# Patient Record
Sex: FEMALE | Race: WHITE | NOT HISPANIC OR LATINO
[De-identification: names, ages, dates, MRNs, and addresses within clinical notes are randomized per-mention and may not be internally consistent; named-entity substitution may affect disease eponyms.]

---

## 2018-04-18 ENCOUNTER — APPOINTMENT (OUTPATIENT)
Dept: PULMONOLOGY | Facility: HOSPITAL | Age: 53
End: 2018-04-18
Payer: COMMERCIAL

## 2018-05-23 ENCOUNTER — APPOINTMENT (OUTPATIENT)
Dept: CT IMAGING | Facility: HOSPITAL | Age: 53
End: 2018-05-23
Payer: COMMERCIAL

## 2018-05-23 ENCOUNTER — OUTPATIENT (OUTPATIENT)
Dept: OUTPATIENT SERVICES | Facility: HOSPITAL | Age: 53
LOS: 1 days | End: 2018-05-23
Payer: COMMERCIAL

## 2018-05-23 ENCOUNTER — APPOINTMENT (OUTPATIENT)
Dept: PULMONOLOGY | Facility: HOSPITAL | Age: 53
End: 2018-05-23
Payer: COMMERCIAL

## 2018-05-23 DIAGNOSIS — Z87.891 PERSONAL HISTORY OF NICOTINE DEPENDENCE: ICD-10-CM

## 2018-05-23 PROCEDURE — XXXXX: CPT

## 2018-05-23 PROCEDURE — G0297: CPT

## 2018-07-23 ENCOUNTER — APPOINTMENT (OUTPATIENT)
Dept: PULMONOLOGY | Facility: CLINIC | Age: 53
End: 2018-07-23

## 2018-08-02 ENCOUNTER — APPOINTMENT (OUTPATIENT)
Dept: PULMONOLOGY | Facility: CLINIC | Age: 53
End: 2018-08-02
Payer: COMMERCIAL

## 2018-08-02 VITALS
TEMPERATURE: 98 F | HEART RATE: 70 BPM | BODY MASS INDEX: 23.78 KG/M2 | OXYGEN SATURATION: 96 % | DIASTOLIC BLOOD PRESSURE: 80 MMHG | RESPIRATION RATE: 16 BRPM | SYSTOLIC BLOOD PRESSURE: 120 MMHG | WEIGHT: 148 LBS | HEIGHT: 66 IN

## 2018-08-02 DIAGNOSIS — J43.9 EMPHYSEMA, UNSPECIFIED: ICD-10-CM

## 2018-08-02 DIAGNOSIS — R93.8 ABNORMAL FINDINGS ON DIAGNOSTIC IMAGING OF OTHER SPECIFIED BODY STRUCTURES: ICD-10-CM

## 2018-08-02 PROCEDURE — 99203 OFFICE O/P NEW LOW 30 MIN: CPT | Mod: 25

## 2018-08-02 PROCEDURE — ZZZZZ: CPT

## 2018-08-02 PROCEDURE — 94729 DIFFUSING CAPACITY: CPT

## 2018-08-02 PROCEDURE — 94060 EVALUATION OF WHEEZING: CPT

## 2018-08-02 PROCEDURE — 94726 PLETHYSMOGRAPHY LUNG VOLUMES: CPT

## 2019-06-03 ENCOUNTER — APPOINTMENT (OUTPATIENT)
Dept: CT IMAGING | Facility: HOSPITAL | Age: 54
End: 2019-06-03

## 2019-06-03 ENCOUNTER — APPOINTMENT (OUTPATIENT)
Dept: PULMONOLOGY | Facility: HOSPITAL | Age: 54
End: 2019-06-03

## 2019-08-01 ENCOUNTER — APPOINTMENT (OUTPATIENT)
Dept: PULMONOLOGY | Facility: HOSPITAL | Age: 54
End: 2019-08-01
Payer: COMMERCIAL

## 2019-08-01 ENCOUNTER — OUTPATIENT (OUTPATIENT)
Dept: OUTPATIENT SERVICES | Facility: HOSPITAL | Age: 54
LOS: 1 days | End: 2019-08-01
Payer: COMMERCIAL

## 2019-08-01 ENCOUNTER — APPOINTMENT (OUTPATIENT)
Dept: PULMONOLOGY | Facility: HOSPITAL | Age: 54
End: 2019-08-01

## 2019-08-01 ENCOUNTER — APPOINTMENT (OUTPATIENT)
Dept: CT IMAGING | Facility: HOSPITAL | Age: 54
End: 2019-08-01
Payer: COMMERCIAL

## 2019-08-01 VITALS — HEIGHT: 65 IN | BODY MASS INDEX: 25.49 KG/M2 | WEIGHT: 153 LBS

## 2019-08-01 DIAGNOSIS — Z87.891 PERSONAL HISTORY OF NICOTINE DEPENDENCE: ICD-10-CM

## 2019-08-01 DIAGNOSIS — Z12.2 ENCOUNTER FOR SCREENING FOR MALIGNANT NEOPLASM OF RESPIRATORY ORGANS: ICD-10-CM

## 2019-08-01 PROCEDURE — G0296 VISIT TO DETERM LDCT ELIG: CPT

## 2019-08-01 PROCEDURE — G0297: CPT | Mod: 26

## 2019-08-01 PROCEDURE — G0297: CPT

## 2019-08-08 ENCOUNTER — APPOINTMENT (OUTPATIENT)
Dept: CARDIOLOGY | Facility: CLINIC | Age: 54
End: 2019-08-08

## 2020-08-20 ENCOUNTER — OUTPATIENT (OUTPATIENT)
Dept: OUTPATIENT SERVICES | Facility: HOSPITAL | Age: 55
LOS: 1 days | End: 2020-08-20
Payer: COMMERCIAL

## 2020-08-20 ENCOUNTER — RESULT REVIEW (OUTPATIENT)
Age: 55
End: 2020-08-20

## 2020-08-20 ENCOUNTER — APPOINTMENT (OUTPATIENT)
Dept: PULMONOLOGY | Facility: HOSPITAL | Age: 55
End: 2020-08-20
Payer: COMMERCIAL

## 2020-08-20 ENCOUNTER — APPOINTMENT (OUTPATIENT)
Dept: CT IMAGING | Facility: HOSPITAL | Age: 55
End: 2020-08-20
Payer: COMMERCIAL

## 2020-08-20 VITALS — HEIGHT: 65 IN | BODY MASS INDEX: 25.49 KG/M2 | WEIGHT: 153 LBS

## 2020-08-20 DIAGNOSIS — Z12.2 ENCOUNTER FOR SCREENING FOR MALIGNANT NEOPLASM OF RESPIRATORY ORGANS: ICD-10-CM

## 2020-08-20 PROCEDURE — XXXXX: CPT

## 2020-08-20 PROCEDURE — G0296 VISIT TO DETERM LDCT ELIG: CPT

## 2020-08-20 PROCEDURE — G0297: CPT | Mod: 26

## 2020-08-20 PROCEDURE — G0297: CPT

## 2020-08-20 NOTE — PLAN
[Regular Exercise] : regular exercise [Smoking Cessation] : smoking cessation [Age appropriate screenings] : Age appropriate screenings [Regular follow-up with healthcare provider] : regular follow-up with healthcare provider [FreeTextEntry1] : Her LDCT is scheduled for today, 8/20/20 at the [facility] location.  Report reviewed with patient. LungRADS2 with recommendation to continue annual screening with LDCT in 12 months.  She was advised to make a follow up appointment with Dr. Kaye.\par \par \par

## 2020-08-20 NOTE — HISTORY OF PRESENT ILLNESS
[Former] : former smoker [_____ pack-years] : [unfilled] pack-years [TextBox_13] : She denies hemoptysis, denies new cough, denies unexplained weight loss. She is a former smoker with a 34 pack year smoking history (1PPD x 34 years). She quit 7 years ago. \par

## 2020-10-29 ENCOUNTER — APPOINTMENT (OUTPATIENT)
Dept: CARDIOLOGY | Facility: CLINIC | Age: 55
End: 2020-10-29
Payer: COMMERCIAL

## 2020-10-29 VITALS
RESPIRATION RATE: 16 BRPM | BODY MASS INDEX: 25.66 KG/M2 | DIASTOLIC BLOOD PRESSURE: 88 MMHG | WEIGHT: 154 LBS | HEIGHT: 65 IN | HEART RATE: 70 BPM | SYSTOLIC BLOOD PRESSURE: 125 MMHG

## 2020-10-29 DIAGNOSIS — R01.1 CARDIAC MURMUR, UNSPECIFIED: ICD-10-CM

## 2020-10-29 DIAGNOSIS — E78.5 HYPERLIPIDEMIA, UNSPECIFIED: ICD-10-CM

## 2020-10-29 DIAGNOSIS — R06.00 DYSPNEA, UNSPECIFIED: ICD-10-CM

## 2020-10-29 DIAGNOSIS — I25.84 ATHEROSCLEROTIC HEART DISEASE OF NATIVE CORONARY ARTERY W/OUT ANGINA PECTORIS: ICD-10-CM

## 2020-10-29 DIAGNOSIS — I25.10 ATHEROSCLEROTIC HEART DISEASE OF NATIVE CORONARY ARTERY W/OUT ANGINA PECTORIS: ICD-10-CM

## 2020-10-29 PROCEDURE — 93000 ELECTROCARDIOGRAM COMPLETE: CPT

## 2020-10-29 PROCEDURE — 99072 ADDL SUPL MATRL&STAF TM PHE: CPT

## 2020-10-29 PROCEDURE — 99204 OFFICE O/P NEW MOD 45 MIN: CPT

## 2020-10-29 NOTE — PHYSICAL EXAM
[General Appearance - Well Developed] : well developed [Normal Appearance] : normal appearance [General Appearance - Well Nourished] : well nourished [Normal Conjunctiva] : the conjunctiva exhibited no abnormalities [Normal Oropharynx] : normal oropharynx [Normal Jugular Venous V Waves Present] : normal jugular venous V waves present [5th Left ICS - MCL] : palpated at the 5th LICS in the midclavicular line [Normal] : normal [No Precordial Heave] : no precordial heave was noted [Normal Rate] : normal [Rhythm Regular] : regular [Normal S1] : normal S1 [Normal S2] : normal S2 [No Gallop] : no gallop heard [I] : a grade 1 [2+] : left 2+ [No Pitting Edema] : no pitting edema present [Respiration, Rhythm And Depth] : normal respiratory rhythm and effort [Exaggerated Use Of Accessory Muscles For Inspiration] : no accessory muscle use [Bowel Sounds] : normal bowel sounds [Abdomen Soft] : soft [Abdomen Tenderness] : non-tender [Abnormal Walk] : normal gait [Nail Clubbing] : no clubbing of the fingernails [Cyanosis, Localized] : no localized cyanosis [Petechial Hemorrhages (___cm)] : no petechial hemorrhages [Skin Color & Pigmentation] : normal skin color and pigmentation [Skin Turgor] : normal skin turgor [] : no rash [No Venous Stasis] : no venous stasis [Oriented To Time, Place, And Person] : oriented to person, place, and time [Impaired Insight] : insight and judgment were intact [Affect] : the affect was normal [No Anxiety] : not feeling anxious [S3] : no S3 [S4] : no S4

## 2020-10-29 NOTE — DISCUSSION/SUMMARY
[FreeTextEntry1] : This is a 55-year-old female with past medical history significant for airway disease, pulmonary nodules, coronary artery calcification, atherosclerosis of the aorta, who comes in for cardiac consultation.\par She denies chest pain, shortness of breath, dizziness and syncope.\par She has no history of rheumatic fever.  She does not drink excessive caffeine or alcohol.  Her cardiac risk factors include history of smoking, Positive family history of heart disease (her sister  from myocardial infarction age 47 and was an insulin-dependent diabetes since childhood, and her father had a stroke at age 60) and hyperlipidemia.\par The patient was found to have a coronary artery calcification score 47 units 2019.  This was described as the majority of the calcification was noted to be in the left anterior descending artery without specific calcification a knowledge of months and the remainder of the coronary artery tree.\par He was placed on Crestor 10 mg per day.\par Blood work done 2020 temperature close to 175, HDL 51, triglycerides 150, calculated LDL of 99 mg/dL, and non-HDL cholesterol 124 mg/dL.\par The presence of coronary artery disease, this patient LDL target should be less than 70 mg/dL.\par She will go for new blood work in the next month.  She will likely require a higher dose of Crestor therapy.\par She will schedule exercise stress test to rule out significant coronary artery disease.  She will also schedule echo Doppler examination to evaluate her murmur, left ventricular function, chamber size, and rule out hypertrophy.\par Electrocardiogram done 2020 demonstrated normal sinus rhythm rate of 84 beats per minute and is otherwise unremarkable.\par She will followup with me at the above noted diagnostic tests are completed.\par

## 2021-08-20 ENCOUNTER — TRANSCRIPTION ENCOUNTER (OUTPATIENT)
Age: 56
End: 2021-08-20

## 2021-12-22 ENCOUNTER — NON-APPOINTMENT (OUTPATIENT)
Age: 56
End: 2021-12-22

## 2021-12-22 VITALS — BODY MASS INDEX: 25.66 KG/M2 | HEIGHT: 65 IN | WEIGHT: 154 LBS

## 2021-12-22 NOTE — PLAN
[Smoking Cessation] : smoking cessation [FreeTextEntry1] : LDCT 12/30/21.  Follow up with Dr. Kaye for the results.

## 2021-12-22 NOTE — HISTORY OF PRESENT ILLNESS
[Former] : former smoker [_____ pack-years] : [unfilled] pack-years [TextBox_13] : Eligibility confirmed.\par Annual Screen\par Former smoker\par 34 pack years (1PPD x 34 years)\par Quit 8 years ago\par Referred by Dr. Panchito Kaye

## 2021-12-30 ENCOUNTER — APPOINTMENT (OUTPATIENT)
Dept: CT IMAGING | Facility: HOSPITAL | Age: 56
End: 2021-12-30

## 2021-12-30 ENCOUNTER — APPOINTMENT (OUTPATIENT)
Dept: PULMONOLOGY | Facility: HOSPITAL | Age: 56
End: 2021-12-30

## 2022-03-03 ENCOUNTER — EMERGENCY (EMERGENCY)
Facility: HOSPITAL | Age: 57
LOS: 1 days | Discharge: HOME | End: 2022-03-03
Attending: EMERGENCY MEDICINE | Admitting: EMERGENCY MEDICINE
Payer: COMMERCIAL

## 2022-03-03 VITALS
DIASTOLIC BLOOD PRESSURE: 90 MMHG | HEART RATE: 87 BPM | TEMPERATURE: 97 F | RESPIRATION RATE: 15 BRPM | OXYGEN SATURATION: 100 % | SYSTOLIC BLOOD PRESSURE: 142 MMHG | HEIGHT: 66 IN

## 2022-03-03 VITALS
TEMPERATURE: 98 F | DIASTOLIC BLOOD PRESSURE: 78 MMHG | SYSTOLIC BLOOD PRESSURE: 123 MMHG | HEART RATE: 84 BPM | RESPIRATION RATE: 18 BRPM | OXYGEN SATURATION: 98 %

## 2022-03-03 LAB
ALBUMIN SERPL ELPH-MCNC: 4.5 G/DL — SIGNIFICANT CHANGE UP (ref 3.3–5)
ALP SERPL-CCNC: 70 U/L — SIGNIFICANT CHANGE UP (ref 40–120)
ALT FLD-CCNC: 23 U/L — SIGNIFICANT CHANGE UP (ref 4–33)
ANION GAP SERPL CALC-SCNC: 12 MMOL/L — SIGNIFICANT CHANGE UP (ref 7–14)
AST SERPL-CCNC: 25 U/L — SIGNIFICANT CHANGE UP (ref 4–32)
BASOPHILS # BLD AUTO: 0.02 K/UL — SIGNIFICANT CHANGE UP (ref 0–0.2)
BASOPHILS NFR BLD AUTO: 0.2 % — SIGNIFICANT CHANGE UP (ref 0–2)
BILIRUB SERPL-MCNC: 0.6 MG/DL — SIGNIFICANT CHANGE UP (ref 0.2–1.2)
BUN SERPL-MCNC: 7 MG/DL — SIGNIFICANT CHANGE UP (ref 7–23)
CALCIUM SERPL-MCNC: 9.1 MG/DL — SIGNIFICANT CHANGE UP (ref 8.4–10.5)
CHLORIDE SERPL-SCNC: 103 MMOL/L — SIGNIFICANT CHANGE UP (ref 98–107)
CO2 SERPL-SCNC: 23 MMOL/L — SIGNIFICANT CHANGE UP (ref 22–31)
CREAT SERPL-MCNC: 0.61 MG/DL — SIGNIFICANT CHANGE UP (ref 0.5–1.3)
D DIMER BLD IA.RAPID-MCNC: <150 NG/ML DDU — SIGNIFICANT CHANGE UP
EGFR: 104 ML/MIN/1.73M2 — SIGNIFICANT CHANGE UP
EOSINOPHIL # BLD AUTO: 0.03 K/UL — SIGNIFICANT CHANGE UP (ref 0–0.5)
EOSINOPHIL NFR BLD AUTO: 0.4 % — SIGNIFICANT CHANGE UP (ref 0–6)
GLUCOSE SERPL-MCNC: 133 MG/DL — HIGH (ref 70–99)
HCT VFR BLD CALC: 42.2 % — SIGNIFICANT CHANGE UP (ref 34.5–45)
HGB BLD-MCNC: 14.5 G/DL — SIGNIFICANT CHANGE UP (ref 11.5–15.5)
IANC: 6.33 K/UL — SIGNIFICANT CHANGE UP (ref 1.5–8.5)
IMM GRANULOCYTES NFR BLD AUTO: 0.4 % — SIGNIFICANT CHANGE UP (ref 0–1.5)
LYMPHOCYTES # BLD AUTO: 1.56 K/UL — SIGNIFICANT CHANGE UP (ref 1–3.3)
LYMPHOCYTES # BLD AUTO: 18.5 % — SIGNIFICANT CHANGE UP (ref 13–44)
MCHC RBC-ENTMCNC: 31 PG — SIGNIFICANT CHANGE UP (ref 27–34)
MCHC RBC-ENTMCNC: 34.4 GM/DL — SIGNIFICANT CHANGE UP (ref 32–36)
MCV RBC AUTO: 90.2 FL — SIGNIFICANT CHANGE UP (ref 80–100)
MONOCYTES # BLD AUTO: 0.45 K/UL — SIGNIFICANT CHANGE UP (ref 0–0.9)
MONOCYTES NFR BLD AUTO: 5.3 % — SIGNIFICANT CHANGE UP (ref 2–14)
NEUTROPHILS # BLD AUTO: 6.33 K/UL — SIGNIFICANT CHANGE UP (ref 1.8–7.4)
NEUTROPHILS NFR BLD AUTO: 75.2 % — SIGNIFICANT CHANGE UP (ref 43–77)
NRBC # BLD: 0 /100 WBCS — SIGNIFICANT CHANGE UP
NRBC # FLD: 0 K/UL — SIGNIFICANT CHANGE UP
PLATELET # BLD AUTO: 219 K/UL — SIGNIFICANT CHANGE UP (ref 150–400)
POTASSIUM SERPL-MCNC: 3.9 MMOL/L — SIGNIFICANT CHANGE UP (ref 3.5–5.3)
POTASSIUM SERPL-SCNC: 3.9 MMOL/L — SIGNIFICANT CHANGE UP (ref 3.5–5.3)
PROT SERPL-MCNC: 7 G/DL — SIGNIFICANT CHANGE UP (ref 6–8.3)
RBC # BLD: 4.68 M/UL — SIGNIFICANT CHANGE UP (ref 3.8–5.2)
RBC # FLD: 13.2 % — SIGNIFICANT CHANGE UP (ref 10.3–14.5)
SODIUM SERPL-SCNC: 138 MMOL/L — SIGNIFICANT CHANGE UP (ref 135–145)
T4 FREE+ TSH PNL SERPL: 0.8 UIU/ML — SIGNIFICANT CHANGE UP (ref 0.27–4.2)
TROPONIN T, HIGH SENSITIVITY RESULT: <6 NG/L — SIGNIFICANT CHANGE UP
WBC # BLD: 8.42 K/UL — SIGNIFICANT CHANGE UP (ref 3.8–10.5)
WBC # FLD AUTO: 8.42 K/UL — SIGNIFICANT CHANGE UP (ref 3.8–10.5)

## 2022-03-03 PROCEDURE — 99285 EMERGENCY DEPT VISIT HI MDM: CPT | Mod: 25

## 2022-03-03 PROCEDURE — 93010 ELECTROCARDIOGRAM REPORT: CPT

## 2022-03-03 RX ADMIN — Medication 0.5 MILLIGRAM(S): at 09:40

## 2022-03-03 NOTE — ED PROVIDER NOTE - NSFOLLOWUPCLINICS_GEN_ALL_ED_FT
[facility]  Psychiatry  [address]  [place] [address]  Phone: [phone]  Fax:     [facility]  Psychiatry  [address] [place] [address]  Phone: [phone]  Fax:

## 2022-03-03 NOTE — ED ADULT TRIAGE NOTE - CHIEF COMPLAINT QUOTE
alert no distress c/o anxiety  has tinnitus insomnia  since december   c/o chest tightness and left arm tingling   states she has had severe anxiety since she had covid in december no  PMHX

## 2022-03-03 NOTE — ED PROVIDER NOTE - INTERNATIONAL TRAVEL
Notes  Assuming care of this 34-year-old female with past medical history of depression and diabetes who presented with SI.  Patient has been medically cleared and is currently awaiting psychiatric placement at [facility].    5:30 PM  Patient still awaiting psychiatric placement.  Will transition care to oncoming physician, Dr. Vaz, for ongoing follow-up.      Past Medical History:   Diagnosis Date   • Depression      No past surgical history on file.    Labs Reviewed   BASIC METABOLIC PANEL - Abnormal; Notable for the following components:       Result Value    Glucose 274 (*)     All other components within normal limits   CBC WITH AUTOMATED DIFFERENTIAL (PERFORMABLE ONLY) - Abnormal; Notable for the following components:    RDW-SD 38.7 (*)     All other components within normal limits    Narrative:     This is an appended report.  These results have been appended to a previously verified report.   URINALYSIS & REFLEX MICROSCOPY WITH CULTURE IF INDICATED - Abnormal; Notable for the following components:    GLUCOSE, URINALYSIS >1000 (*)     OCCULT BLOOD, URINALYSIS Large (*)     BACTERIA, URINALYSIS Moderate (*)     All other components within normal limits   GLUCOSE, BEDSIDE - POINT OF CARE - Abnormal; Notable for the following components:    GLUCOSE, BEDSIDE - POINT OF CARE 183 (*)     All other components within normal limits   GLUCOSE, BEDSIDE - POINT OF CARE - Abnormal; Notable for the following components:    GLUCOSE, BEDSIDE - POINT OF CARE 171 (*)     All other components within normal limits   GLUCOSE, BEDSIDE - POINT OF CARE - Abnormal; Notable for the following components:    GLUCOSE, BEDSIDE - POINT OF CARE 172 (*)     All other components within normal limits   GLUCOSE, BEDSIDE - POINT OF CARE - Abnormal; Notable for the following components:    GLUCOSE, BEDSIDE - POINT OF CARE 171 (*)     All other components within normal limits   GLUCOSE, BEDSIDE - POINT OF CARE - Abnormal; Notable for the following  components:    GLUCOSE, BEDSIDE - POINT OF CARE 188 (*)     All other components within normal limits   GLUCOSE, BEDSIDE - POINT OF CARE - Abnormal; Notable for the following components:    GLUCOSE, BEDSIDE - POINT OF CARE 250 (*)     All other components within normal limits   DRUG SCREEN, URINE - Normal   POCT URINE PREGNANCY - Normal   CBC WITH DIFFERENTIAL    Narrative:     The following orders were created for panel order CBC with Automated Differential.  Procedure                               Abnormality         Status                     ---------                               -----------         ------                     CBC with Automated Dif...[[id number]]  Abnormal            Final result                 Please view results for these tests on the individual orders.   ALCOHOL   LIGHT GREEN TOP   LAVENDER TOP   LIGHT BLUE TOP   LIGHT GREEN TOP   LAVENDER TOP   GOLD TOP   PINK TOP TUBE   RAINBOW DRAW    Narrative:     The following orders were created for panel order [place] Draw Light Blue Top Collected? 1 Label; Red Top Collected? No Labels; Light Green Top Collected? 1 Label; Lavender Top Collected? 1 Label; Gold Top Collected? 1 Label; Pink Top Collected? 1 Label; Grey Top Collected? No Labels.  Procedure                               Abnormality         Status                     ---------                               -----------         ------                     Light Blue Top[[id number]]                                                            Light Green Top[[id number]]                                Final result               Lavender Top[[id number]]                                   Final result               Gold Top[[id number]]                                                                  Pink Top Tube[[id number]]                                                               Please view results for these tests on the individual orders.   LIGHT BLUE TOP   GOLD TOP   PINK TOP TUBE    RAINBOW DRAW    Narrative:     The following orders were created for panel order [place] Draw Light Blue Top Collected? 1 Label; Red Top Collected? 1 Label; Light Green Top Collected? 1 Label; Lavender Top Collected? 1 Label; Gold Top Collected? 1 Label; Pink Top Collected? 1 Label; Grey Top Collected? No Labels.  Procedure                               Abnormality         Status                     ---------                               -----------         ------                     Light Blue Top[[id number]]                                 Final result               Red Top[[id number]]                                                                   Light Green Top[[id number]]                                Final result               Lavender Top[[id number]]                                   Final result               Gold Top[[id number]]                                       Final result               Pink Top Tube[[id number]]                                  Final result                 Please view results for these tests on the individual orders.   RED TOP   RAPID SARS-COV-2 BY PCR       No orders to display               ED Diagnosis   1. Depression, unspecified depression type     2. Self-destructive behavior     3. Suicidal ideation         Disposition        Transfer to Another Facility 4/6/2021  7:48 PM  Sandi Urbina should be transferred out to closed psychiatric facility     Arabella Segovia MD  04/07/21 1816     No

## 2022-03-03 NOTE — ED PROVIDER NOTE - PATIENT PORTAL LINK FT
You can access the FollowMyHealth Patient Portal offered by [facility] by registering at the following website: [email or web address]. By joining [facility]’s FollowMyHealth portal, you will also be able to view your health information using other applications (apps) compatible with our system.

## 2022-03-03 NOTE — ED ADULT NURSE NOTE - CHIEF COMPLAINT
Left message on patients wife's cell phone number as requested. Asked to call back to discuss symptoms.    The patient is a 56y Female complaining of

## 2022-03-03 NOTE — ED ADULT NURSE NOTE - OBJECTIVE STATEMENT
Pt received to room 22. A&Ox4. Ambulatory at baseline. Pmh of tinnitus, anxiety. Pt c/o tinnitus and anxiety s/p positive for COVID in Dec 2021. Pt states after having COVID tinnitus began and was prescribed medications to help sleep at night with no relief. Pt states the stress r/t the tinnitus has caused her chest discomfort and anxiety beginning as of the morning unsure of how long. Pt endorses since Dec 2021 r/t pt's tinnitus, drinking 3-4 glasses of wine per night. Pt states she had stopped approximately 2 weeks ago once prescribed medications to help with anxiety and insomnia. Pt resp even unlabored, breath sounds clear anterior and posterior, abd soft nontender, bowel sounds heard in all four quadrants, pedal pulses 2+ bilaterally. Pt denies SOB, nausea, vomiting, diarrhea, headaches, dizziness, numbness/tingling to hands/feet, feeling of palpitations. Pt vitally stable, NSR on the monitor. Awaiting further orders at this time.

## 2022-03-03 NOTE — ED PROVIDER NOTE - ATTENDING CONTRIBUTION TO CARE
Dr. Bacon: 57 yo female with PMH Covid 3 months ago, with tinnitus since having Covid, also anxiety and insomnia, in ED with insomnia and palpations.  She notes chest tightness sometimes, but no pain or tightness at the time of my eval.  Pt has seen ENT, was told no acute intervention at the time, placed on lipo-flavinoid vitamin, then saw PMD who started her on alprazolam, then transitioned to zolpidem, and she started paroxetine last night.  Pt states that she has been having insomnia--she gets into bed and starts to feel anxious and anticipates that she will not be able to sleep, and so she does not sleep.  Last night she was awake all night despite zolpidem, and so she came to the ED. Dr. Bacon: 55 yo female with PMH Covid 3 months ago, with tinnitus since having Covid, also anxiety and insomnia, in ED with insomnia and palpations.  She notes chest tightness sometimes, but no pain or tightness at the time of my eval.  Pt has seen ENT, was told no acute intervention at the time, placed on lipo-flavinoid vitamin, then saw PMD who started her on alprazolam, then transitioned to zolpidem, and she started paroxetine last night.  Pt states that she has been having insomnia--she gets into bed and starts to feel anxious and anticipates that she will not be able to sleep, and so she does not sleep.  Last night she was awake all night despite zolpidem, and so she came to the ED.  No CP/SOB, N/V/D or abdominal pain.  On exam pt overall well appearing, appears somewhat anxious (pt admits she feels anxious) and in mild distress, heart RRR, lungs CTAB, abd NTND, extremities without swelling, strength 5/5 in all extremities and skin without rash.  Gait normal.  Pt states that if discharged she can follow up with her PMD tomorrow.

## 2022-03-03 NOTE — ED PROVIDER NOTE - CARE PLAN
1 Principal Discharge DX:	Anxiety  Secondary Diagnosis:	Insomnia   Principal Discharge DX:	Insomnia  Secondary Diagnosis:	Anxiety  Secondary Diagnosis:	Insomnia

## 2022-03-03 NOTE — ED PROVIDER NOTE - NS ED ROS FT
GEN: No fever, chills, night sweats, weight loss  EYES: No vision changes, irritation, itchiness  ENT: No ear pain, congestion, sore throat  RESP: No cough or trouble breathing  CARDIOVASCULAR: No chest pain or palpitations  GI: No nausea/vomiting, diarrhea, constipation  :  No change in urine output; no dysuria, hematuria, or discharge  MSK: No joint or muscle pain  SKIN: No rashes  NEURO: No headache; no abnormal movements; no numbness or tingling  Remainder negative, except as per the HPI

## 2022-03-03 NOTE — ED PROVIDER NOTE - OBJECTIVE STATEMENT
Ms. Stephenson is a 56F with h/o COVID-19 (12/2021), anxiety who p/w persistent tinnitus, worsening anxiety and insomnia (as a complication of the tinnitus) that started since she had COVID. She has been seeing her PCP who started with Xanax and then transitioned to Ambien.    ENT: Dr. Valencia

## 2022-03-03 NOTE — ED POST DISCHARGE NOTE - RESULT SUMMARY
Dr. Bacon: pt called after discharge, states that she did not want to be discharged, wanted to be admitted to hospital due to insomnia; discussed with pt that most appropriate management would be outpatient follow up at this time, which pt understands; I also provided pt with [facility] phone number and address if she would like to follow up there

## 2022-03-03 NOTE — ED PROVIDER NOTE - PHYSICAL EXAMINATION
GEN: Awake, AOx3, NAD.  HEENT: NCAT  ---EYES: no scleral icterus, EOMI, PERRLA  CARDIO: RRR. Normal S1/S2, no m/r/g. No JVD.  RESP: CTAB  ABD: Soft, NTND. BS+. No masses, no hepatosplenomegaly.  : No CVAT.   MSK: No obvious deformity or ROM deficit. 2+ pulses x4. No edema.  SKIN: Warm, dry. No rashes. Nail beds without cyanosis or clubbing.  NEURO: Moves all four extremities spontaneously  PSYCH: Appropriate mood & affect. GEN: Awake, AOx3, NAD.  HEENT: NCAT  ---EYES: no scleral icterus  CARDIO: RRR. Normal S1/S2, no m/r/g. No JVD.  RESP: CTAB, no w/r/r  ABD: Soft, NTND.   MSK: No obvious deformity or ROM deficit.   SKIN: Warm, dry.   NEURO: Moves all four extremities spontaneously  PSYCH: Appropriate mood & affect. No SI/HI; speech normal ricky & pattern, not pressured;

## 2022-03-03 NOTE — ED PROVIDER NOTE - CLINICAL SUMMARY MEDICAL DECISION MAKING FREE TEXT BOX
Ms. Stephenson is a 56F with h/o COVID-19 in 12/2021 who presents with persistent tinnitus c/b insomnia c/b anxiety/mood disturbance w/o acute psychiatric complication (incl. no SI/HI, no psychosis). She reports concurrent palpitations with intermittent pleuritic CP and given covid-19 hx and otherwise low risk (no leg swelling or other symptoms), would r/o PE  - CBC, CMP, Trop, D-Dimer  - ECG  - TSH with F54

## 2022-03-03 NOTE — ED PROVIDER NOTE - NSFOLLOWUPINSTRUCTIONS_ED_ALL_ED_FT
You were seen in the Emergency Department for severe tinnitus complicated by insomnia and anxiety. Fortunately, we did not identify an emergent cause of your tinnitus and without other symptoms, we do not believe you are experiencing a life-threatening medical or behavioral emergency at this time. You should continue to follow-up with your ENT for this problem.    You should return to the Emergency Department if you develop:  - Any thoughts of self-harm or severe depression  - Any auditory or visual hallucinations  - Any worsening of your respirations, chest pain, or other symptoms that you feel should be evaluated    Ask your doctor about the following medications for sleep and mood:  - Trazodone: this is a medication sometimes used to help with insomnia, typical doses start at 50mg but can be lower in some patients.  - Other Non-Paclitaxel SSRI's e.g. Duloxetine (these medications can be helpful for mood but do take > 2 months to work)  - Seroquel: This is an anti-psychotic, but it has sedative properties which can be helpful in refractory insomnia.

## 2022-03-03 NOTE — ED PROVIDER NOTE - PROGRESS NOTE DETAILS
Wil Monterroso MD PGY-1  Pt emphatic about anxiety impacting current status. Will give low dose Lorazepam x1, reassess. Would not d/c with new rx. Wil Monterroso MD PGY-1  Labs, including Trop, TSH, D-Dimer all WNL. Low likelihood for emergent process. Will recommend lipo-flavinoid vitamin for tinnitus, provide psych numbers, including [email or web address] Wil Monterroso MD PGY-1  Pt found resting but not asleep, describes liliana-sleep myoclonic jerks that wake her and then prevent her from getting back to sleep. Pt doesn't know of Lorazepam really helped.

## 2022-03-30 ENCOUNTER — APPOINTMENT (OUTPATIENT)
Dept: PULMONOLOGY | Facility: CLINIC | Age: 57
End: 2022-03-30

## 2022-04-20 ENCOUNTER — APPOINTMENT (OUTPATIENT)
Dept: PULMONOLOGY | Facility: CLINIC | Age: 57
End: 2022-04-20

## 2022-05-25 ENCOUNTER — APPOINTMENT (OUTPATIENT)
Dept: PULMONOLOGY | Facility: CLINIC | Age: 57
End: 2022-05-25

## 2022-06-09 ENCOUNTER — APPOINTMENT (OUTPATIENT)
Dept: PULMONOLOGY | Facility: CLINIC | Age: 57
End: 2022-06-09

## 2023-07-13 ENCOUNTER — APPOINTMENT (OUTPATIENT)
Dept: OBGYN | Facility: CLINIC | Age: 58
End: 2023-07-13

## 2023-07-24 ENCOUNTER — ASOB RESULT (OUTPATIENT)
Age: 58
End: 2023-07-24

## 2023-07-24 ENCOUNTER — APPOINTMENT (OUTPATIENT)
Dept: OBGYN | Facility: CLINIC | Age: 58
End: 2023-07-24
Payer: COMMERCIAL

## 2023-07-24 PROCEDURE — 58340 CATHETER FOR HYSTEROGRAPHY: CPT

## 2023-07-24 PROCEDURE — 76831 ECHO EXAM UTERUS: CPT

## 2024-06-06 ENCOUNTER — APPOINTMENT (OUTPATIENT)
Dept: OTOLARYNGOLOGY | Facility: CLINIC | Age: 59
End: 2024-06-06

## 2024-06-11 ENCOUNTER — APPOINTMENT (OUTPATIENT)
Dept: OBGYN | Facility: CLINIC | Age: 59
End: 2024-06-11
Payer: COMMERCIAL

## 2024-06-11 ENCOUNTER — ASOB RESULT (OUTPATIENT)
Age: 59
End: 2024-06-11

## 2024-06-11 PROCEDURE — 58340 CATHETER FOR HYSTEROGRAPHY: CPT

## 2024-06-11 PROCEDURE — 76831 ECHO EXAM UTERUS: CPT

## 2024-08-20 ENCOUNTER — APPOINTMENT (OUTPATIENT)
Dept: OTOLARYNGOLOGY | Facility: CLINIC | Age: 59
End: 2024-08-20
Payer: COMMERCIAL

## 2024-08-20 VITALS
HEIGHT: 66 IN | WEIGHT: 154 LBS | SYSTOLIC BLOOD PRESSURE: 154 MMHG | HEART RATE: 75 BPM | BODY MASS INDEX: 24.75 KG/M2 | DIASTOLIC BLOOD PRESSURE: 94 MMHG

## 2024-08-20 DIAGNOSIS — M26.623 ARTHRALGIA OF BILATERAL TEMPOROMANDIBULAR JOINT: ICD-10-CM

## 2024-08-20 DIAGNOSIS — H93.13 TINNITUS, BILATERAL: ICD-10-CM

## 2024-08-20 DIAGNOSIS — M54.2 CERVICALGIA: ICD-10-CM

## 2024-08-20 PROCEDURE — 92557 COMPREHENSIVE HEARING TEST: CPT

## 2024-08-20 PROCEDURE — 92625 TINNITUS ASSESSMENT: CPT

## 2024-08-20 PROCEDURE — 92504 EAR MICROSCOPY EXAMINATION: CPT

## 2024-08-20 PROCEDURE — 99204 OFFICE O/P NEW MOD 45 MIN: CPT

## 2024-08-20 PROCEDURE — 92567 TYMPANOMETRY: CPT

## 2024-08-20 RX ORDER — ZOLPIDEM TARTRATE 5 MG/1
TABLET, FILM COATED ORAL
Refills: 0 | Status: ACTIVE | COMMUNITY

## 2024-08-20 RX ORDER — VITAMIN B COMPLEX
CAPSULE ORAL
Refills: 0 | Status: ACTIVE | COMMUNITY

## 2024-08-20 RX ORDER — ATORVASTATIN CALCIUM 80 MG/1
TABLET, FILM COATED ORAL
Refills: 0 | Status: ACTIVE | COMMUNITY

## 2024-08-20 NOTE — PHYSICAL EXAM
[Binocular Microscopic Exam] : Binocular microscopic exam was performed [Hearing Karimi Test (Tuning Fork On Forehead)] : no lateralization of tone [Hearing Loss Right Only] : normal [Hearing Loss Left Only] : normal [Rinne Test Air Conduction Persists > Bone Conduction Right] : bone conduction greater than air conduction on the right [Rinne Test Air Conduction Persists > Bone Conduction Left] : bone conduction greater than air conduction on the left [Nystagmus] : ~T no ~M nystagmus was seen [Fukuda Step Test] : Fukuda Step Test was Negative [Romberg's Sign] : Romberg's sign was absent [Fistula Sign] : Fistula Sign: Negative [Past-Pointing] : Past-Pointing: Negative [Ann-Marie-[place]] : D[place]ott-Hallpike: Negative [Normal] : no rashes

## 2024-08-20 NOTE — DATA REVIEWED
[de-identified] : An audiogram was ordered and performed including TPM, pure tones, tympanometry and speech testing for the patients complaint of tinnitus I have independently reviewed the patient's audiogram from today and my findings include normal hearing, TPM to 4k, masks at 50db

## 2024-08-20 NOTE — HISTORY OF PRESENT ILLNESS
[de-identified] : 59 year old female presents for initial evaluation of tinnitus. Hx of vertigo since her 20s - not treated with any meds Had covid in 2020 and bilateral tinnitus developed after  Worse in quiet environments Taking Flavinoids, Vitamin B complex and Zinc with minimal relief to tinnitus Occasional right otalgia  Denies otorrhea, ear infections, hearing loss, headaches related to hearing.

## 2024-08-21 PROBLEM — E78.5 HYPERLIPIDEMIA, UNSPECIFIED: Chronic | Status: ACTIVE | Noted: 2024-08-15

## 2024-08-28 ENCOUNTER — TRANSCRIPTION ENCOUNTER (OUTPATIENT)
Age: 59
End: 2024-08-28

## 2024-08-28 NOTE — ASU PATIENT PROFILE, ADULT - FALL HARM RISK - UNIVERSAL INTERVENTIONS
Bed in lowest position, wheels locked, appropriate side rails in place/Call bell, personal items and telephone in reach/Instruct patient to call for assistance before getting out of bed or chair/Non-slip footwear when patient is out of bed/[place] to call system/Physically safe environment - no spills, clutter or unnecessary equipment/Purposeful Proactive Rounding/Room/bathroom lighting operational, light cord in reach

## 2024-08-28 NOTE — ASU PATIENT PROFILE, ADULT - METHOD:
----- Message from Cynthia Hayden PA-C sent at 11/7/2019  7:29 AM CST -----  Please notify pt that his cbc does not show anemia.   verbal

## 2024-08-29 ENCOUNTER — OUTPATIENT (OUTPATIENT)
Dept: OUTPATIENT SERVICES | Facility: HOSPITAL | Age: 59
LOS: 1 days | Discharge: HOME | End: 2024-08-29
Payer: COMMERCIAL

## 2024-08-29 ENCOUNTER — TRANSCRIPTION ENCOUNTER (OUTPATIENT)
Age: 59
End: 2024-08-29

## 2024-08-29 VITALS
SYSTOLIC BLOOD PRESSURE: 142 MMHG | DIASTOLIC BLOOD PRESSURE: 95 MMHG | HEART RATE: 78 BPM | OXYGEN SATURATION: 99 % | WEIGHT: 153 LBS | RESPIRATION RATE: 15 BRPM | TEMPERATURE: 98 F | HEIGHT: 65 IN

## 2024-08-29 VITALS
OXYGEN SATURATION: 96 % | SYSTOLIC BLOOD PRESSURE: 124 MMHG | DIASTOLIC BLOOD PRESSURE: 84 MMHG | RESPIRATION RATE: 15 BRPM | HEART RATE: 67 BPM

## 2024-08-29 DIAGNOSIS — Z98.890 OTHER SPECIFIED POSTPROCEDURAL STATES: Chronic | ICD-10-CM

## 2024-08-29 DIAGNOSIS — N84.0 POLYP OF CORPUS UTERI: ICD-10-CM

## 2024-08-29 PROCEDURE — 88305 TISSUE EXAM BY PATHOLOGIST: CPT | Mod: 26

## 2024-08-29 DEVICE — MYOSURE TISSUE REMOVAL DEVICE REACH
Type: IMPLANTABLE DEVICE | Status: NON-FUNCTIONAL
Removed: 2024-08-29

## 2024-08-29 RX ORDER — ONDANSETRON 2 MG/ML
4 INJECTION, SOLUTION INTRAMUSCULAR; INTRAVENOUS ONCE
Refills: 0 | Status: ACTIVE | OUTPATIENT
Start: 2024-08-29 | End: 2025-07-28

## 2024-08-29 RX ORDER — FENTANYL CITRATE 50 UG/ML
50 INJECTION INTRAMUSCULAR; INTRAVENOUS
Refills: 0 | Status: DISCONTINUED | OUTPATIENT
Start: 2024-08-29 | End: 2024-08-29

## 2024-08-29 RX ORDER — OXYCODONE HYDROCHLORIDE 5 MG/1
5 TABLET ORAL ONCE
Refills: 0 | Status: DISCONTINUED | OUTPATIENT
Start: 2024-08-29 | End: 2024-08-29

## 2024-08-29 NOTE — BRIEF OPERATIVE NOTE - NSICDXBRIEFPROCEDURE_GEN_ALL_CORE_FT
PROCEDURES:  Hysteroscopy, with dilation and curettage of uterus and polypectomy or uterine myomectomy using MyoSure tissue removal system 29-Aug-2024 12:24:58  Nissa Cruz

## 2024-08-29 NOTE — ASU DISCHARGE PLAN (ADULT/PEDIATRIC) - NS MD DC FALL RISK RISK
For information on Fall & Injury Prevention, visit: [email or web address] OR  [email or web address] OR  https://www.cdc.gov/steadi/patient.html

## 2024-08-29 NOTE — ASU DISCHARGE PLAN (ADULT/PEDIATRIC) - CARE PROVIDER_API CALL
Nissa Cruz  Obstetrics and Gynecology  [address], [address]  [place] [address]  Phone: [phone]  Fax: [phone]  Follow Up Time: 2 weeks

## 2024-08-29 NOTE — BRIEF OPERATIVE NOTE - OPERATION/FINDINGS
EUA with small mobile uterus. Hysteroscopy with large endometrial polyp measuring about 2.5 cm in length and a smaller cervical polyp about 2 mm. Ostia seen and appeared normal with the left one being stenotic.

## 2024-08-29 NOTE — ASU PREOP CHECKLIST - BOWEL PREP
8/2 @ 8:24  Father left voicemail to cancel today's appointment  Juve Ohara in the ER all night 
n/a

## 2024-09-05 LAB — SURGICAL PATHOLOGY STUDY: SIGNIFICANT CHANGE UP

## 2024-09-06 ENCOUNTER — EMERGENCY (EMERGENCY)
Facility: HOSPITAL | Age: 59
LOS: 1 days | Discharge: HOME | End: 2024-09-06
Attending: EMERGENCY MEDICINE | Admitting: EMERGENCY MEDICINE
Payer: COMMERCIAL

## 2024-09-06 VITALS
OXYGEN SATURATION: 99 % | DIASTOLIC BLOOD PRESSURE: 90 MMHG | TEMPERATURE: 98 F | HEART RATE: 72 BPM | SYSTOLIC BLOOD PRESSURE: 143 MMHG | RESPIRATION RATE: 19 BRPM

## 2024-09-06 VITALS
HEIGHT: 65 IN | DIASTOLIC BLOOD PRESSURE: 89 MMHG | RESPIRATION RATE: 16 BRPM | WEIGHT: 151.02 LBS | HEART RATE: 85 BPM | SYSTOLIC BLOOD PRESSURE: 133 MMHG | TEMPERATURE: 98 F | OXYGEN SATURATION: 100 %

## 2024-09-06 DIAGNOSIS — Z98.890 OTHER SPECIFIED POSTPROCEDURAL STATES: Chronic | ICD-10-CM

## 2024-09-06 LAB
ALBUMIN SERPL ELPH-MCNC: 4.5 G/DL — SIGNIFICANT CHANGE UP (ref 3.3–5)
ALP SERPL-CCNC: 70 U/L — SIGNIFICANT CHANGE UP (ref 40–120)
ALT FLD-CCNC: 18 U/L — SIGNIFICANT CHANGE UP (ref 4–33)
ANION GAP SERPL CALC-SCNC: 13 MMOL/L — SIGNIFICANT CHANGE UP (ref 7–14)
APPEARANCE UR: CLEAR — SIGNIFICANT CHANGE UP
AST SERPL-CCNC: 18 U/L — SIGNIFICANT CHANGE UP (ref 4–32)
BACTERIA # UR AUTO: NEGATIVE /HPF — SIGNIFICANT CHANGE UP
BASOPHILS # BLD AUTO: 0.02 K/UL — SIGNIFICANT CHANGE UP (ref 0–0.2)
BASOPHILS NFR BLD AUTO: 0.2 % — SIGNIFICANT CHANGE UP (ref 0–2)
BILIRUB SERPL-MCNC: 0.5 MG/DL — SIGNIFICANT CHANGE UP (ref 0.2–1.2)
BILIRUB UR-MCNC: NEGATIVE — SIGNIFICANT CHANGE UP
BLD GP AB SCN SERPL QL: NEGATIVE — SIGNIFICANT CHANGE UP
BUN SERPL-MCNC: 6 MG/DL — LOW (ref 7–23)
CALCIUM SERPL-MCNC: 10 MG/DL — SIGNIFICANT CHANGE UP (ref 8.4–10.5)
CAST: 0 /LPF — SIGNIFICANT CHANGE UP (ref 0–4)
CHLORIDE SERPL-SCNC: 101 MMOL/L — SIGNIFICANT CHANGE UP (ref 98–107)
CO2 SERPL-SCNC: 24 MMOL/L — SIGNIFICANT CHANGE UP (ref 22–31)
COLOR SPEC: ABNORMAL
CREAT SERPL-MCNC: 0.63 MG/DL — SIGNIFICANT CHANGE UP (ref 0.5–1.3)
DIFF PNL FLD: ABNORMAL
EGFR: 101 ML/MIN/1.73M2 — SIGNIFICANT CHANGE UP
EOSINOPHIL # BLD AUTO: 0.03 K/UL — SIGNIFICANT CHANGE UP (ref 0–0.5)
EOSINOPHIL NFR BLD AUTO: 0.3 % — SIGNIFICANT CHANGE UP (ref 0–6)
GLUCOSE SERPL-MCNC: 98 MG/DL — SIGNIFICANT CHANGE UP (ref 70–99)
GLUCOSE UR QL: NEGATIVE MG/DL — SIGNIFICANT CHANGE UP
HCT VFR BLD CALC: 40.1 % — SIGNIFICANT CHANGE UP (ref 34.5–45)
HGB BLD-MCNC: 13.9 G/DL — SIGNIFICANT CHANGE UP (ref 11.5–15.5)
IANC: 5.74 K/UL — SIGNIFICANT CHANGE UP (ref 1.8–7.4)
IMM GRANULOCYTES NFR BLD AUTO: 0.3 % — SIGNIFICANT CHANGE UP (ref 0–0.9)
KETONES UR-MCNC: NEGATIVE MG/DL — SIGNIFICANT CHANGE UP
LEUKOCYTE ESTERASE UR-ACNC: ABNORMAL
LYMPHOCYTES # BLD AUTO: 2.23 K/UL — SIGNIFICANT CHANGE UP (ref 1–3.3)
LYMPHOCYTES # BLD AUTO: 26 % — SIGNIFICANT CHANGE UP (ref 13–44)
MCHC RBC-ENTMCNC: 31.3 PG — SIGNIFICANT CHANGE UP (ref 27–34)
MCHC RBC-ENTMCNC: 34.7 GM/DL — SIGNIFICANT CHANGE UP (ref 32–36)
MCV RBC AUTO: 90.3 FL — SIGNIFICANT CHANGE UP (ref 80–100)
MONOCYTES # BLD AUTO: 0.54 K/UL — SIGNIFICANT CHANGE UP (ref 0–0.9)
MONOCYTES NFR BLD AUTO: 6.3 % — SIGNIFICANT CHANGE UP (ref 2–14)
NEUTROPHILS # BLD AUTO: 5.74 K/UL — SIGNIFICANT CHANGE UP (ref 1.8–7.4)
NEUTROPHILS NFR BLD AUTO: 66.9 % — SIGNIFICANT CHANGE UP (ref 43–77)
NITRITE UR-MCNC: NEGATIVE — SIGNIFICANT CHANGE UP
NRBC # BLD: 0 /100 WBCS — SIGNIFICANT CHANGE UP (ref 0–0)
NRBC # FLD: 0 K/UL — SIGNIFICANT CHANGE UP (ref 0–0)
PH UR: 6.5 — SIGNIFICANT CHANGE UP (ref 5–8)
PLATELET # BLD AUTO: 256 K/UL — SIGNIFICANT CHANGE UP (ref 150–400)
POTASSIUM SERPL-MCNC: 4.5 MMOL/L — SIGNIFICANT CHANGE UP (ref 3.5–5.3)
POTASSIUM SERPL-SCNC: 4.5 MMOL/L — SIGNIFICANT CHANGE UP (ref 3.5–5.3)
PROT SERPL-MCNC: 7.3 G/DL — SIGNIFICANT CHANGE UP (ref 6–8.3)
PROT UR-MCNC: 100 MG/DL
RBC # BLD: 4.44 M/UL — SIGNIFICANT CHANGE UP (ref 3.8–5.2)
RBC # FLD: 12.9 % — SIGNIFICANT CHANGE UP (ref 10.3–14.5)
RBC CASTS # UR COMP ASSIST: 122 /HPF — HIGH (ref 0–4)
RH IG SCN BLD-IMP: POSITIVE — SIGNIFICANT CHANGE UP
SODIUM SERPL-SCNC: 138 MMOL/L — SIGNIFICANT CHANGE UP (ref 135–145)
SP GR SPEC: 1 — SIGNIFICANT CHANGE UP (ref 1–1.03)
SQUAMOUS # UR AUTO: 0 /HPF — SIGNIFICANT CHANGE UP (ref 0–5)
UROBILINOGEN FLD QL: 0.2 MG/DL — SIGNIFICANT CHANGE UP (ref 0.2–1)
WBC # BLD: 8.59 K/UL — SIGNIFICANT CHANGE UP (ref 3.8–10.5)
WBC # FLD AUTO: 8.59 K/UL — SIGNIFICANT CHANGE UP (ref 3.8–10.5)
WBC UR QL: 1 /HPF — SIGNIFICANT CHANGE UP (ref 0–5)

## 2024-09-06 PROCEDURE — 76830 TRANSVAGINAL US NON-OB: CPT | Mod: 26

## 2024-09-06 PROCEDURE — 99285 EMERGENCY DEPT VISIT HI MDM: CPT

## 2024-09-06 NOTE — ED PROVIDER NOTE - CLINICAL SUMMARY MEDICAL DECISION MAKING FREE TEXT BOX
59-year-old female status post hysteroscopy, polypectomy, D&C 8/29/2024 with Dr. Cruz presents for vaginal bleeding starting yesterday.  Vital signs stable.  Patient nontoxic-appearing, well-perfused, nondiaphoretic.  Abdomen nontender.  Speculum exam with clot at os but no active bleeding.  Will obtain labs, transvaginal ultrasound, gynecology consult.  Disposition pending.

## 2024-09-06 NOTE — ED PROVIDER NOTE - NSFOLLOWUPINSTRUCTIONS_ED_ALL_ED_FT
You were seen in the emergency department for vaginal bleeding after your gynecological procedure.  Your blood work did not show any significant anemia or evidence of any damage to your organs.  Your transvaginal ultrasound demonstrated products in the uterus that possibly may be blood.  Gynecology evaluated you and recommended no additional testing, immediate procedure or treatment, or hospitalization.  Please take over-the-counter pain analgesia such as ibuprofen for your pain.  Please return to care for any new or worsening symptoms including but not limited to vaginal bleeding greater than 1 pad per hour, significant abdominal pain, lightheadedness, dizziness, passing out, chest pain, shortness of breath, palpitations.  Follow-up with your gynecologist as early as able.

## 2024-09-06 NOTE — ED PROVIDER NOTE - PROGRESS NOTE DETAILS
Labs nonactionable.  Transvaginal ultrasound with likely blood products in the uterus.  Patient remains hemodynamically stable.  Gynecology evaluated patient and does not recommend any additional testing or treatment.  GYN does recommend that patient continue Augmentin prescription from outpatient clinic visit today.  Return precautions given.

## 2024-09-06 NOTE — CONSULT NOTE ADULT - ASSESSMENT
59y , postmenopausal (LMP 11 years ago) now POD #8 from D&C, hysteroscopy, and polypectomy with MyoSure for incidentally found thickened EMS (pathology=benign) presenting with mild post operative bleeding.     Vitals are WNL in the ED.  H/H appear to be _.  TVUS _.    Final recommendations pending results.    Discussed with Attending Dr. Nancy Saleh, PGY-2 59y , postmenopausal (LMP 11 years ago) now POD #8 from D&C, hysteroscopy, and polypectomy with MyoSure for incidentally found thickened EMS (pathology=benign) presenting with mild post operative bleeding.     Vitals are WNL in the ED.  H/H appear to be WNL at 13.9/40.1. WBC wnl at 8.59.  TVUS 6.8 cm x 3.4 cm x 3.5 cm. Within normal limits. Endometrium: 23 mm. Thickened, heterogeneous, and avascular endometrium. Adnexa could not be visualized. No free fluid.    No acute intervention indicated at this time.  Stable for discharge from GYN perspective.  Please instruct patient to continue taking Augmentin as prescribed for treatment of suspected endometritis.  Can make a follow up appointment in 1-2 weeks.    Discussed with Attending Dr. Nancy Saleh, PGY-2

## 2024-09-06 NOTE — ED PROVIDER NOTE - OBJECTIVE STATEMENT
59-year-old female status post hysteroscopy, polypectomy, D&C 8/29/2024 with Dr. Cruz presents for vaginal bleeding starting yesterday.  Patient states that she changed 5 pads today, is passing clots, and is having associated lower abdominal cramping.  Went and saw Dr. Cruz today who prescribed her antibiotics for possible infection.  After clinic visit, patient continued to bleed and presented to the ED.  Not on anticoagulation.  Denies fever, chills, dysuria, hematuria, lightheadedness, dizziness, shortness of breath, palpitations, syncope.

## 2024-09-06 NOTE — ED PROVIDER NOTE - PHYSICAL EXAMINATION
GEN:  Non-toxic appearing, non-distressed, speaking full sentences, non-diaphoretic, AAOx3  HEENT:  NCAT, neck supple, EOMI, PERRLA, sclera anicteric, no conjunctival pallor or injection, no stridor, normal voice, no tonsillar exudate, uvula midline  CV:  regular rhythm and rate, s1/s2 audible, no murmurs, rubs or gallops, peripheral pulses 2+ and symmetric  PULM:  non-labored respirations, lungs clear to auscultation bilaterally, no wheezes, crackles or rales  ABD:  non distended, non-tender, no rebound, no guarding, negative Salguero's sign, bowel sounds normal, no cvat  MSK:  no gross deformity, non-tender extremities and joints, range of motion grossly normal appearing, no extremity edema, extremities warm and well perfused   NEURO:  AAOx3, CN II-XII intact, motor 5/5 in upper and lower extremities bilaterally, sensation grossly intact in extremities and trunk, finger to nose testing wnl, no nystagmus, negative Romberg, no pronator drift, no gait deficit  SKIN:  warm, dry, no rash or vesicles   GYN:  normal appearing mons/vulva; speculum exam with clot at os, no active bleeding    ED Tech Teresa present during exam

## 2024-09-06 NOTE — CONSULT NOTE ADULT - SUBJECTIVE AND OBJECTIVE BOX
R2 GYNECOLOGY CONSULT NOTE    HPI    59y , postmenopausal (LMP 11 years ago)    Presents to the ED for vaginal bleeding. Patient is POD #8 from D&C, hysteroscopy, and polypectomy with MyoSure for incidentally found thickened EMS (pathology: benign).    Patient states that she was initially recovering well post operatively (minimal bleeding and cramping well controlled with Aleve).   Reports that she began passing quarter-sized to golf ball sized blood clots since yesterday (approximately 3-4 blood clots per day) associated with vaginal bleeding described as saturating a panty liner q2-3 hours.  She reports missing her usual dose of Aleve earlier today, after which she began experiencing 10/10 bilateral lower abdominal cramping described to radiate to her back and down her bilateral legs.  The patient was evaluated in Dr. Cruz's office earlier today, after which she was given Aleve with complete resolution of cramping and was sent home with a Rx for Augmentin to treat possible endometritis. The patient states she has not yet begun taking the antibiotics. She reports continued concern for passing intermittent blood clots, prompting ED visit.    Upon my evaluation of the patient, she rates her pain 0/10. Reports mild constipation (last BM earlier today).  Denies lightheadedness, dizziness, chest pain, SOB, palpitations, nausea, vomiting, fevers, chills, dysuria, hematuria, urgency, frequency, diarrhea.    – OBHx:  x1, TOP w/ D&C x1  – GynHx: Denies ovarian cysts, fibroids, Hx of abnormal pap smears, Hx of STIs  – PMH: HLD, GERD  – PSH: D&C x3  – Psych: Anxiety (unmedicated, does not see a therapist)   – Social: denies EtOH, smoking, recreational drug use  – Meds: Atorvastatin, Omeprazole, Ambien, Aleve PRN  – Allergies: NKDA    Objective  – VS  T(C): 37 (24 @ 18:20)  HR: 78 (24 @ 18:20)  BP: 133/81 (24 @ 18:20)  RR: 18 (24 @ 18:20)  SpO2: 98% (24 @ 18:20)  – PE:   General: Resting comfortably, NAD  CV: Well perfused  Pulm: Nonlabored breathing  Abd: Soft, nondistended. Nontender to deep palpation. No rebound or guarding.  Extr: No swelling. No calf tenderness bilaterally.  – Spec: Normal appearing cervix. 1 dime-sized blood clot in the vaginal vault. No active bleeding/pooling.   – VE: -CMT. No fundal tenderness. No adnexal tenderness. No fundal or adnexal masses palpated.    Pelvic exam performed with ED Chaperone    LABS:  Pending.                  RADIOLOGY & ADDITIONAL STUDIES:  Pending.         R2 GYNECOLOGY CONSULT NOTE    HPI    59y , postmenopausal (LMP 11 years ago)    Presents to the ED for vaginal bleeding. Patient is POD #8 from D&C, hysteroscopy, and polypectomy with MyoSure for incidentally found thickened EMS (pathology: benign).    Patient states that she was initially recovering well post operatively (minimal bleeding and cramping well controlled with Aleve).   Reports that she began passing quarter-sized to golf ball sized blood clots since yesterday (approximately 3-4 blood clots per day) associated with vaginal bleeding described as saturating a panty liner q2-3 hours.  She reports missing her usual dose of Aleve earlier today, after which she began experiencing 10/10 bilateral lower abdominal cramping described to radiate to her back and down her bilateral legs.  The patient was evaluated in Dr. Cruz's office earlier today, after which she was given Aleve with complete resolution of cramping and was sent home with a Rx for Augmentin to treat possible endometritis. The patient states she has not yet begun taking the antibiotics. She reports continued concern for passing intermittent blood clots, prompting ED visit.    Upon my evaluation of the patient, she rates her pain 0/10. Reports mild constipation (last BM earlier today).  Denies lightheadedness, dizziness, chest pain, SOB, palpitations, nausea, vomiting, fevers, chills, dysuria, hematuria, urgency, frequency, diarrhea.    – OBHx:  x1, TOP w/ D&C x1  – GynHx: Denies ovarian cysts, fibroids, Hx of abnormal pap smears, Hx of STIs  – PMH: HLD, GERD  – PSH: D&C x3  – Psych: Anxiety (unmedicated, does not see a therapist)   – Social: denies EtOH, smoking, recreational drug use  – Meds: Atorvastatin, Omeprazole, Ambien, Aleve PRN  – Allergies: NKDA    Objective  – VS  T(C): 37 (24 @ 18:20)  HR: 78 (24 @ 18:20)  BP: 133/81 (24 @ 18:20)  RR: 18 (24 @ 18:20)  SpO2: 98% (24 @ 18:20)  – PE:   General: Resting comfortably, NAD  CV: Well perfused  Pulm: Nonlabored breathing  Abd: Soft, nondistended. Nontender to deep palpation. No rebound or guarding.  Extr: No swelling. No calf tenderness bilaterally.  – Spec: Normal appearing cervix. 1 dime-sized blood clot in the vaginal vault. No active bleeding/pooling.   – VE: -CMT. No fundal tenderness. No adnexal tenderness. No fundal or adnexal masses palpated.    Pelvic exam performed with ED Chaperone    LABS:                        13.9   8.59  )-----------( 256      ( 06 Sep 2024 21:05 )             40.1               138  |  101  |  6<L>  ----------------------------<  98  4.5   |  24  |  0.63    Ca    10.0      06 Sep 2024 21:05    TPro  7.3  /  Alb  4.5  /  TBili  0.5  /  DBili  x   /  AST  18  /  ALT  18  /  AlkPhos  70                RADIOLOGY & ADDITIONAL STUDIES:  ACC: [id number] EXAM:  US TRANSVAGINAL   ORDERED BY: PAULA SILVA     PROCEDURE DATE:  2024          INTERPRETATION:  CLINICAL INFORMATION: Vaginal bleeding. Status post TLC   2024 for polyp removal.    LMP: Postmenopausal    COMPARISON: None available.    TECHNIQUE:  Endovaginal pelvic sonogram only.    FINDINGS:  Uterus: 6.8 cm x 3.4 cm x 3.5 cm. Within normal limits.  Endometrium: 23 mm. Thickened, heterogeneous, and avascular endometrium.    The ovaries could not be visualized.    Fluid: None.    IMPRESSION:  Thickened, heterogeneous, and avascular endometrium measuring up to 23 mm   in thickness, likely blood products.        --- End of Report ---            NAVARRO ROBERTSON MD; Attending Radiologist  This document has been electronically signed. Sep  6 2024  9:04PM

## 2024-09-06 NOTE — ED ADULT NURSE NOTE - OBJECTIVE STATEMENT
68 year old female brought to room 12. Pt reports she had procedure (polypectomy and D&C) on 8/29 and then yesterday developed vaginal bleeding with clots, and abd spasms, now endorsing lightheadedness and subjective fevers. Reports she went through 6 pads today.  Denies dysuria. denies pmhx.  patient laying in semi fowlers position on the stretcher. patient alert and oriented times four. patient denies shortness of breath, chest pain, nausea, vomiting, chill, fever. Patient normal sinus on the monitor. Respirations equal and adequate. Safety measures in place, call bell within reach. Patient stable upon leaving the room. 68 year old female brought to room 12. Pt reports she had procedure (polypectomy and D&C) on 8/29 and then yesterday developed vaginal bleeding with clots, and abd spasms, now endorsing lightheadedness and subjective fevers. Reports she went through 6 pads today.  Denies dysuria. denies pmhx.  patient laying in semi fowlers position on the stretcher. patient alert and oriented times four. patient denies shortness of breath, chest pain, nausea, vomiting, chill, fever. 20 gauge left a,c IV patent. Patient normal sinus on the monitor. Respirations equal and adequate. Safety measures in place, call bell within reach. Patient stable upon leaving the room.

## 2024-09-06 NOTE — ED ADULT TRIAGE NOTE - CHIEF COMPLAINT QUOTE
Pt reports she had procedure (polypectomy and D&C) on 8/29 and then yesterday developed vaginal bleeding with clots, and abd spasms, now endorsing lightheadedness and subjective fevers. Reports she went through 6 pads today.  Denies dysuria. denies pmhx.

## 2024-09-06 NOTE — ED ADULT NURSE NOTE - NSFALLUNIVINTERV_ED_ALL_ED
Bed/Stretcher in lowest position, wheels locked, appropriate side rails in place/Call bell, personal items and telephone in reach/Instruct patient to call for assistance before getting out of bed/chair/stretcher/Non-slip footwear applied when patient is off stretcher/[place] to call system/Physically safe environment - no spills, clutter or unnecessary equipment/Purposeful proactive rounding/Room/bathroom lighting operational, light cord in reach

## 2024-09-06 NOTE — ED PROVIDER NOTE - PATIENT PORTAL LINK FT
You can access the FollowMyHealth Patient Portal offered by [facility] by registering at the following website: [email or web address]. By joining [facility]’s FollowMyHealth portal, you will also be able to view your health information using other applications (apps) compatible with our system.

## 2024-09-10 LAB
-  AMOXICILLIN/CLAVULANIC ACID: SIGNIFICANT CHANGE UP
-  AMPICILLIN/SULBACTAM: SIGNIFICANT CHANGE UP
-  AMPICILLIN: SIGNIFICANT CHANGE UP
-  AZTREONAM: SIGNIFICANT CHANGE UP
-  CEFAZOLIN: SIGNIFICANT CHANGE UP
-  CEFEPIME: SIGNIFICANT CHANGE UP
-  CEFOXITIN: SIGNIFICANT CHANGE UP
-  CEFTRIAXONE: SIGNIFICANT CHANGE UP
-  CEFUROXIME: SIGNIFICANT CHANGE UP
-  CIPROFLOXACIN: SIGNIFICANT CHANGE UP
-  ERTAPENEM: SIGNIFICANT CHANGE UP
-  GENTAMICIN: SIGNIFICANT CHANGE UP
-  IMIPENEM: SIGNIFICANT CHANGE UP
-  LEVOFLOXACIN: SIGNIFICANT CHANGE UP
-  MEROPENEM: SIGNIFICANT CHANGE UP
-  NITROFURANTOIN: SIGNIFICANT CHANGE UP
-  PIPERACILLIN/TAZOBACTAM: SIGNIFICANT CHANGE UP
-  TOBRAMYCIN: SIGNIFICANT CHANGE UP
-  TRIMETHOPRIM/SULFAMETHOXAZOLE: SIGNIFICANT CHANGE UP
CULTURE RESULTS: ABNORMAL
METHOD TYPE: SIGNIFICANT CHANGE UP
ORGANISM # SPEC MICROSCOPIC CNT: ABNORMAL
ORGANISM # SPEC MICROSCOPIC CNT: ABNORMAL
SPECIMEN SOURCE: SIGNIFICANT CHANGE UP

## 2024-10-29 ENCOUNTER — APPOINTMENT (OUTPATIENT)
Dept: CARDIOLOGY | Facility: CLINIC | Age: 59
End: 2024-10-29

## 2024-11-19 ENCOUNTER — APPOINTMENT (OUTPATIENT)
Dept: OTOLARYNGOLOGY | Facility: CLINIC | Age: 59
End: 2024-11-19

## 2024-12-27 DIAGNOSIS — R06.09 OTHER FORMS OF DYSPNEA: ICD-10-CM

## 2024-12-30 ENCOUNTER — APPOINTMENT (OUTPATIENT)
Dept: PULMONOLOGY | Facility: CLINIC | Age: 59
End: 2024-12-30
Payer: COMMERCIAL

## 2024-12-30 ENCOUNTER — OUTPATIENT (OUTPATIENT)
Dept: OUTPATIENT SERVICES | Facility: HOSPITAL | Age: 59
LOS: 1 days | End: 2024-12-30
Payer: COMMERCIAL

## 2024-12-30 ENCOUNTER — APPOINTMENT (OUTPATIENT)
Dept: RADIOLOGY | Facility: IMAGING CENTER | Age: 59
End: 2024-12-30
Payer: COMMERCIAL

## 2024-12-30 VITALS
DIASTOLIC BLOOD PRESSURE: 79 MMHG | HEIGHT: 66 IN | BODY MASS INDEX: 24.11 KG/M2 | SYSTOLIC BLOOD PRESSURE: 133 MMHG | HEART RATE: 83 BPM | WEIGHT: 150 LBS

## 2024-12-30 DIAGNOSIS — Z98.890 OTHER SPECIFIED POSTPROCEDURAL STATES: Chronic | ICD-10-CM

## 2024-12-30 DIAGNOSIS — J43.9 EMPHYSEMA, UNSPECIFIED: ICD-10-CM

## 2024-12-30 PROCEDURE — 71046 X-RAY EXAM CHEST 2 VIEWS: CPT | Mod: 26

## 2024-12-30 PROCEDURE — 94060 EVALUATION OF WHEEZING: CPT

## 2024-12-30 PROCEDURE — 94729 DIFFUSING CAPACITY: CPT

## 2024-12-30 PROCEDURE — ZZZZZ: CPT

## 2024-12-30 PROCEDURE — 71046 X-RAY EXAM CHEST 2 VIEWS: CPT

## 2024-12-30 PROCEDURE — 99203 OFFICE O/P NEW LOW 30 MIN: CPT | Mod: 25

## 2024-12-30 PROCEDURE — 94726 PLETHYSMOGRAPHY LUNG VOLUMES: CPT

## 2025-07-02 ENCOUNTER — APPOINTMENT (OUTPATIENT)
Dept: ORTHOPEDIC SURGERY | Facility: CLINIC | Age: 60
End: 2025-07-02

## 2025-07-09 ENCOUNTER — APPOINTMENT (OUTPATIENT)
Dept: ORTHOPEDIC SURGERY | Facility: CLINIC | Age: 60
End: 2025-07-09

## 2025-08-05 ENCOUNTER — APPOINTMENT (OUTPATIENT)
Dept: ORTHOPEDIC SURGERY | Facility: CLINIC | Age: 60
End: 2025-08-05

## 2025-08-25 ENCOUNTER — APPOINTMENT (OUTPATIENT)
Dept: OBGYN | Facility: CLINIC | Age: 60
End: 2025-08-25
Payer: COMMERCIAL

## 2025-08-25 PROCEDURE — 81002 URINALYSIS NONAUTO W/O SCOPE: CPT

## 2025-08-25 PROCEDURE — 99396 PREV VISIT EST AGE 40-64: CPT

## 2025-08-25 PROCEDURE — 99459 PELVIC EXAMINATION: CPT | Mod: NC

## 2025-08-25 PROCEDURE — 82270 OCCULT BLOOD FECES: CPT

## (undated) DEVICE — PACK D&C

## (undated) DEVICE — PREP DYNA-HEX CHG 4% 4OZ BOTTLE (BACTOSHIELD)

## (undated) DEVICE — PREP TRAY DRY SKIN PREP SCRUB

## (undated) DEVICE — MYOSURE SCOPE SEAL

## (undated) DEVICE — FLUENT FMS PROCEDURE KIT